# Patient Record
Sex: MALE | Race: BLACK OR AFRICAN AMERICAN | NOT HISPANIC OR LATINO | URBAN - METROPOLITAN AREA
[De-identification: names, ages, dates, MRNs, and addresses within clinical notes are randomized per-mention and may not be internally consistent; named-entity substitution may affect disease eponyms.]

---

## 2020-08-13 ENCOUNTER — LAB OUTSIDE AN ENCOUNTER (OUTPATIENT)
Dept: URBAN - METROPOLITAN AREA TELEHEALTH 2 | Facility: TELEHEALTH | Age: 60
End: 2020-08-13

## 2020-08-13 ENCOUNTER — OFFICE VISIT (OUTPATIENT)
Dept: URBAN - METROPOLITAN AREA TELEHEALTH 2 | Facility: TELEHEALTH | Age: 60
End: 2020-08-13
Payer: COMMERCIAL

## 2020-08-13 DIAGNOSIS — I85.10 SECONDARY ESOPHAGEAL VARICES WITHOUT BLEEDING: ICD-10-CM

## 2020-08-13 DIAGNOSIS — B18.2 CARRIER OF VIRAL HEPATITIS C: ICD-10-CM

## 2020-08-13 DIAGNOSIS — K74.69 CIRRHOSIS, CRYPTOGENIC: ICD-10-CM

## 2020-08-13 DIAGNOSIS — K72.90 HEPATIC ENCEPHALOPATHY: ICD-10-CM

## 2020-08-13 PROCEDURE — 99443 PHONE E/M BY PHYS 21-30 MIN: CPT | Performed by: NURSE PRACTITIONER

## 2020-08-13 RX ORDER — LACTULOSE 10 G/15ML
15 ML SOLUTION ORAL BID
Status: ACTIVE | COMMUNITY

## 2020-08-13 RX ORDER — PROPRANOLOL HYDROCHLORIDE 20 MG/1
TAKE 2 TABLETS (40 MG) BY ORAL ROUTE 2 TIMES PER DAY TABLET ORAL 2
Qty: 0 | Refills: 0 | Status: ACTIVE | COMMUNITY
Start: 1900-01-01

## 2020-08-13 RX ORDER — FUROSEMIDE 20 MG/1
1 TABLET TABLET ORAL ONCE A DAY
Status: ACTIVE | COMMUNITY

## 2020-08-13 RX ORDER — SPIRONOLACTONE 50 MG/1
1 TABLET TABLET, FILM COATED ORAL ONCE A DAY
Status: ACTIVE | COMMUNITY

## 2020-08-13 NOTE — HPI-TODAY'S VISIT:
Mr. 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  He has sequela of portal hypertension with a history of esophageal varices in May 2019 on propranolol.  He has a history of ascites on low-dose Lasix 20 mg and spironolactone 50 mg.  He states he has had no significant accumulation and tries to adhere to a low-sodium diet.  He has a history of encephalopathy, he titrates his lactulose to 2-3 bowel movements most days.  He is status post treatment for hepatitis C with DAA per the [place] liver team.  His viral load was negative in December 2019 with SVR.  Today he is here for follow-up, he is due for blood work, imaging, along with [facility] screening.  He had a CT in January 2019 after an abnormal ultrasound.  It was determined that he did not have hepatocellular carcinoma.  Today he is doing well, he has no specific GI complaints, he does have some mild numbness in the tips of his fingers.  He is agreeable to pursue repeat upper endoscopy for [facility] screening, along with labs and imaging.  He did have an allergic reaction to the IV contrast, he will need ultrasound or MRI imaging future.  MB

## 2020-08-20 ENCOUNTER — TELEPHONE ENCOUNTER (OUTPATIENT)
Dept: URBAN - METROPOLITAN AREA CLINIC 92 | Facility: CLINIC | Age: 60
End: 2020-08-20

## 2020-08-20 LAB
A/G RATIO: 1.1
AFP, SERUM, TUMOR MARKER: 5.9
ALBUMIN: 3.4
ALKALINE PHOSPHATASE: 248
ALT (SGPT): 16
AST (SGOT): 43
BASO (ABSOLUTE): 0
BASOS: 1
BILIRUBIN, TOTAL: 0.9
BUN/CREATININE RATIO: 7
BUN: 6
CALCIUM: 8.2
CARBON DIOXIDE, TOTAL: 22
CHLORIDE: 106
CREATININE: 0.85
EGFR IF AFRICN AM: 111
EGFR IF NONAFRICN AM: 96
EOS (ABSOLUTE): 0.5
EOS: 9
GLOBULIN, TOTAL: 3.2
GLUCOSE: 95
HCV AB: >11
HCV LOG10: (no result)
HEMATOCRIT: 34.5
HEMATOLOGY COMMENTS:: (no result)
HEMOGLOBIN: 10.1
HEPATITIS C QUANTITATION: (no result)
IMMATURE CELLS: (no result)
IMMATURE GRANS (ABS): 0
IMMATURE GRANULOCYTES: 0
INR: 1.2
INTERPRETATION:: (no result)
LYMPHS (ABSOLUTE): 2.8
LYMPHS: 48
MCH: 21.9
MCHC: 29.3
MCV: 75
MONOCYTES(ABSOLUTE): 0.8
MONOCYTES: 15
NEUTROPHILS (ABSOLUTE): 1.5
NEUTROPHILS: 27
NRBC: (no result)
PLATELETS: 126
POTASSIUM: 4.6
PROTEIN, TOTAL: 6.6
PROTHROMBIN TIME: 12.9
RBC: 4.61
RDW: 18
SODIUM: 139
TEST INFORMATION:: (no result)
WBC: 5.7

## 2020-08-20 RX ORDER — LACTULOSE 10 G/15ML
15 ML SOLUTION ORAL BID
Qty: 2700 ML | Refills: 3

## 2020-09-24 ENCOUNTER — OFFICE VISIT (OUTPATIENT)
Dept: URBAN - METROPOLITAN AREA MEDICAL CENTER 1 | Facility: MEDICAL CENTER | Age: 60
End: 2020-09-24
Payer: COMMERCIAL

## 2020-09-24 DIAGNOSIS — K29.60 ADENOPAPILLOMATOSIS GASTRICA: ICD-10-CM

## 2020-09-24 DIAGNOSIS — I85.10 ESOPH VARICE OTHER DIS: ICD-10-CM

## 2020-09-24 DIAGNOSIS — K74.69 CIRRHOSIS, CRYPTOGENIC: ICD-10-CM

## 2020-09-24 DIAGNOSIS — B96.81 BACTERIAL INFECTION DUE TO H. PYLORI: ICD-10-CM

## 2020-09-24 PROCEDURE — 43239 EGD BIOPSY SINGLE/MULTIPLE: CPT | Performed by: INTERNAL MEDICINE

## 2020-10-10 ENCOUNTER — TELEPHONE ENCOUNTER (OUTPATIENT)
Dept: URBAN - METROPOLITAN AREA CLINIC 92 | Facility: CLINIC | Age: 60
End: 2020-10-10

## 2020-10-10 RX ORDER — AMOXICILLIN 500 MG/1
2 CAPSULES CAPSULE ORAL
Qty: 56 CAPSULE | Refills: 0 | OUTPATIENT
Start: 2020-10-10 | End: 2020-10-24

## 2020-10-10 RX ORDER — SPIRONOLACTONE 50 MG/1
1 TABLET TABLET, FILM COATED ORAL ONCE A DAY
Status: ACTIVE | COMMUNITY

## 2020-10-10 RX ORDER — OMEPRAZOLE 40 MG/1
1 CAPSULE 30 MINUTES BEFORE MORNING MEAL CAPSULE, DELAYED RELEASE ORAL BID
Qty: 28 CAPSULE | Refills: 0 | OUTPATIENT
Start: 2020-10-10

## 2020-10-10 RX ORDER — CLARITHROMYCIN 500 MG/1
1 TABLET TABLET, FILM COATED ORAL
Qty: 28 TABLET | Refills: 0 | OUTPATIENT
Start: 2020-10-10 | End: 2020-10-24

## 2020-10-10 RX ORDER — FUROSEMIDE 20 MG/1
1 TABLET TABLET ORAL ONCE A DAY
Status: ACTIVE | COMMUNITY

## 2020-10-10 RX ORDER — PROPRANOLOL HYDROCHLORIDE 20 MG/1
TAKE 2 TABLETS (40 MG) BY ORAL ROUTE 2 TIMES PER DAY TABLET ORAL 2
Qty: 0 | Refills: 0 | Status: ACTIVE | COMMUNITY
Start: 1900-01-01

## 2020-10-10 RX ORDER — LACTULOSE 10 G/15ML
15 ML SOLUTION ORAL BID
Qty: 2700 ML | Refills: 3 | Status: ACTIVE | COMMUNITY

## 2020-11-02 ENCOUNTER — ERX REFILL RESPONSE (OUTPATIENT)
Dept: URBAN - METROPOLITAN AREA CLINIC 92 | Facility: CLINIC | Age: 60
End: 2020-11-02

## 2020-11-02 RX ORDER — OMEPRAZOLE 40 MG/1
1 CAPSULE 30 MINUTES BEFORE MORNING MEAL CAPSULE, DELAYED RELEASE ORAL BID
Qty: 90 | Refills: 3 | OUTPATIENT

## 2020-11-02 RX ORDER — OMEPRAZOLE 40 MG/1
TAKE 1 CAPSULE 30 MINUTES BEFORE MORNING MEAL BID ORALLY 90 CAPSULE, DELAYED RELEASE ORAL
Qty: 90 CAPSULE | Refills: 3 | OUTPATIENT

## 2020-11-02 RX ORDER — OMEPRAZOLE 40 MG/1
1 CAPSULE 30 MINUTES BEFORE MORNING MEAL CAPSULE, DELAYED RELEASE ORAL BID
Qty: 90 | Refills: 3

## 2020-11-03 ENCOUNTER — ERX REFILL RESPONSE (OUTPATIENT)
Dept: URBAN - METROPOLITAN AREA CLINIC 92 | Facility: CLINIC | Age: 60
End: 2020-11-03

## 2020-12-18 ENCOUNTER — LAB OUTSIDE AN ENCOUNTER (OUTPATIENT)
Dept: URBAN - NONMETROPOLITAN AREA CLINIC 13 | Facility: CLINIC | Age: 60
End: 2020-12-18

## 2020-12-18 ENCOUNTER — OFFICE VISIT (OUTPATIENT)
Dept: URBAN - NONMETROPOLITAN AREA CLINIC 13 | Facility: CLINIC | Age: 60
End: 2020-12-18
Payer: COMMERCIAL

## 2020-12-18 VITALS
HEIGHT: 69 IN | WEIGHT: 200 LBS | SYSTOLIC BLOOD PRESSURE: 153 MMHG | BODY MASS INDEX: 29.62 KG/M2 | HEART RATE: 62 BPM | DIASTOLIC BLOOD PRESSURE: 85 MMHG | TEMPERATURE: 98.4 F

## 2020-12-18 DIAGNOSIS — I85.10 SECONDARY ESOPHAGEAL VARICES WITHOUT BLEEDING: ICD-10-CM

## 2020-12-18 DIAGNOSIS — K72.90 HEPATIC ENCEPHALOPATHY: ICD-10-CM

## 2020-12-18 DIAGNOSIS — R18.8 ASCITES: ICD-10-CM

## 2020-12-18 DIAGNOSIS — D50.8 OTHER IRON DEFICIENCY ANEMIAS: ICD-10-CM

## 2020-12-18 DIAGNOSIS — K74.69 OTHER CIRRHOSIS OF LIVER: ICD-10-CM

## 2020-12-18 DIAGNOSIS — B19.20 HEPATITIS C: ICD-10-CM

## 2020-12-18 PROCEDURE — G8427 DOCREV CUR MEDS BY ELIG CLIN: HCPCS | Performed by: INTERNAL MEDICINE

## 2020-12-18 PROCEDURE — 99214 OFFICE O/P EST MOD 30 MIN: CPT | Performed by: INTERNAL MEDICINE

## 2020-12-18 PROCEDURE — G8483 FLU IMM NO ADMIN DOC REA: HCPCS | Performed by: INTERNAL MEDICINE

## 2020-12-18 PROCEDURE — G9903 PT SCRN TBCO ID AS NON USER: HCPCS | Performed by: INTERNAL MEDICINE

## 2020-12-18 PROCEDURE — G8417 CALC BMI ABV UP PARAM F/U: HCPCS | Performed by: INTERNAL MEDICINE

## 2020-12-18 PROCEDURE — 1036F TOBACCO NON-USER: CPT | Performed by: INTERNAL MEDICINE

## 2020-12-18 PROCEDURE — 3017F COLORECTAL CA SCREEN DOC REV: CPT | Performed by: INTERNAL MEDICINE

## 2020-12-18 RX ORDER — PROPRANOLOL HYDROCHLORIDE 20 MG/1
TAKE 2 TABLETS (40 MG) BY ORAL ROUTE 2 TIMES PER DAY TABLET ORAL 2
Qty: 0 | Refills: 0 | Status: ACTIVE | COMMUNITY
Start: 1900-01-01

## 2020-12-18 RX ORDER — OMEPRAZOLE 40 MG/1
TAKE 1 CAPSULE 30 MINUTES BEFORE MORNING MEAL BID ORALLY 90 CAPSULE, DELAYED RELEASE ORAL
Qty: 90 CAPSULE | Refills: 3 | Status: ACTIVE | COMMUNITY

## 2020-12-18 RX ORDER — SPIRONOLACTONE 50 MG/1
1 TABLET TABLET, FILM COATED ORAL ONCE A DAY
Status: ACTIVE | COMMUNITY

## 2020-12-18 RX ORDER — LACTULOSE 10 G/15ML
15 ML SOLUTION ORAL BID
Qty: 2700 ML | Refills: 3 | Status: ACTIVE | COMMUNITY

## 2020-12-18 RX ORDER — FUROSEMIDE 20 MG/1
1 TABLET TABLET ORAL ONCE A DAY
Status: ACTIVE | COMMUNITY

## 2020-12-18 NOTE — HPI-TODAY'S VISIT:
Mr. 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  He has sequela of portal hypertension with a history of esophageal varices in May 2019 on propranolol.  He has a history of ascites on low-dose Lasix 20 mg and spironolactone 50 mg.  He states he has had no significant accumulation and tries to adhere to a low-sodium diet.  He has a history of encephalopathy, he titrates his lactulose to 2-3 bowel movements most days.  He is status post treatment for hepatitis C with DAA per the [place] liver team.  His viral load was negative in December 2019 with SVR.  Today he is here for follow-up, he is due for blood work, imaging, along with [facility] screening.  He had a CT in January 2019 after an abnormal ultrasound.  It was determined that he did not have hepatocellular carcinoma.  Today he is doing well, he has no specific GI complaints, he does have some mild numbness in the tips of his fingers.  He is agreeable to pursue repeat upper endoscopy for [facility] screening, along with labs and imaging.  He did have an allergic reaction to the IV contrast, he will need ultrasound or MRI imaging future.  MB   12/18/2020  Mr. Rae presents for follow up of ESLD secondary to HCV and ETOH. Since his last visit he is s/p EGD with grade I varices. He is on his NSBB with no s/s of GI bleeding. His HE is stable on lactulose and xifaxan. His ascites is stable on low dose diuretics. He is due for repeat labs and US imaging. Today he is doing great. MB

## 2020-12-29 ENCOUNTER — TELEPHONE ENCOUNTER (OUTPATIENT)
Dept: URBAN - METROPOLITAN AREA CLINIC 92 | Facility: CLINIC | Age: 60
End: 2020-12-29

## 2021-01-08 ENCOUNTER — TELEPHONE ENCOUNTER (OUTPATIENT)
Dept: URBAN - METROPOLITAN AREA CLINIC 23 | Facility: CLINIC | Age: 61
End: 2021-01-08

## 2021-03-22 ENCOUNTER — LAB OUTSIDE AN ENCOUNTER (OUTPATIENT)
Dept: URBAN - METROPOLITAN AREA CLINIC 92 | Facility: CLINIC | Age: 61
End: 2021-03-22

## 2021-06-18 ENCOUNTER — OFFICE VISIT (OUTPATIENT)
Dept: URBAN - NONMETROPOLITAN AREA CLINIC 13 | Facility: CLINIC | Age: 61
End: 2021-06-18
Payer: COMMERCIAL

## 2021-06-18 ENCOUNTER — LAB OUTSIDE AN ENCOUNTER (OUTPATIENT)
Dept: URBAN - NONMETROPOLITAN AREA CLINIC 13 | Facility: CLINIC | Age: 61
End: 2021-06-18

## 2021-06-18 ENCOUNTER — WEB ENCOUNTER (OUTPATIENT)
Dept: URBAN - NONMETROPOLITAN AREA CLINIC 13 | Facility: CLINIC | Age: 61
End: 2021-06-18

## 2021-06-18 DIAGNOSIS — K72.90 HEPATIC ENCEPHALOPATHY: ICD-10-CM

## 2021-06-18 DIAGNOSIS — I85.10 SECONDARY ESOPHAGEAL VARICES WITHOUT BLEEDING: ICD-10-CM

## 2021-06-18 DIAGNOSIS — R18.8 ASCITES: ICD-10-CM

## 2021-06-18 DIAGNOSIS — K74.60 CIRRHOSIS: ICD-10-CM

## 2021-06-18 DIAGNOSIS — Z12.11 COLON CANCER SCREENING: ICD-10-CM

## 2021-06-18 DIAGNOSIS — D50.9 IDA (IRON DEFICIENCY ANEMIA): ICD-10-CM

## 2021-06-18 DIAGNOSIS — B19.20 HEPATITIS C: ICD-10-CM

## 2021-06-18 PROCEDURE — 99214 OFFICE O/P EST MOD 30 MIN: CPT | Performed by: INTERNAL MEDICINE

## 2021-06-18 RX ORDER — OMEPRAZOLE 40 MG/1
TAKE 1 CAPSULE 30 MINUTES BEFORE MORNING MEAL BID ORALLY 90 CAPSULE, DELAYED RELEASE ORAL
Qty: 90 CAPSULE | Refills: 3 | Status: ACTIVE | COMMUNITY

## 2021-06-18 RX ORDER — SPIRONOLACTONE 50 MG/1
1 TABLET TABLET, FILM COATED ORAL ONCE A DAY
Status: ACTIVE | COMMUNITY

## 2021-06-18 RX ORDER — FUROSEMIDE 20 MG/1
1 TABLET TABLET ORAL ONCE A DAY
Status: ACTIVE | COMMUNITY

## 2021-06-18 RX ORDER — LACTULOSE 10 G/15ML
15 ML SOLUTION ORAL BID
Qty: 2700 ML | Refills: 3 | Status: ACTIVE | COMMUNITY

## 2021-06-18 RX ORDER — PROPRANOLOL HYDROCHLORIDE 20 MG/1
TAKE 2 TABLETS (40 MG) BY ORAL ROUTE 2 TIMES PER DAY TABLET ORAL 2
Qty: 0 | Refills: 0 | Status: ACTIVE | COMMUNITY
Start: 1900-01-01

## 2021-06-18 NOTE — PHYSICAL EXAM GASTROINTESTINAL
Abdomen , soft, nontender, nondistended , normal bowel sounds Pt ambulatory to emir Valente, SERINA  09/15/19 0539

## 2021-06-18 NOTE — HPI-TODAY'S VISIT:
Mr. 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  He has sequela of portal hypertension with a history of esophageal varices in May 2019 on propranolol.  He has a history of ascites on low-dose Lasix 20 mg and spironolactone 50 mg.  He states he has had no significant accumulation and tries to adhere to a low-sodium diet.  He has a history of encephalopathy, he titrates his lactulose to 2-3 bowel movements most days.  He is status post treatment for hepatitis C with DAA per the [place] liver team.  His viral load was negative in December 2019 with SVR.  Today he is here for follow-up, he is due for blood work, imaging, along with [facility] screening.  He had a CT in January 2019 after an abnormal ultrasound.  It was determined that he did not have hepatocellular carcinoma.  Today he is doing well, he has no specific GI complaints, he does have some mild numbness in the tips of his fingers.  He is agreeable to pursue repeat upper endoscopy for [facility] screening, along with labs and imaging.  He did have an allergic reaction to the IV contrast, he will need ultrasound or MRI imaging future.  MB   12/18/2020  Mr. Rae presents for follow up of ESLD secondary to HCV and ETOH. Since his last visit he is s/p EGD with grade I varices. He is on his NSBB with no s/s of GI bleeding. His HE is stable on lactulose and xifaxan. His ascites is stable on low dose diuretics. He is due for repeat labs and US imaging. Today he is doing great. MB   6/19/2021 Mr. 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  He is doing well, he has a low meld.  He is managing his ascites with just Lasix daily, he has had no reaccumulation on a low-sodium diet.  His encephalopathy is managed with lactulose 2-3 times daily, he has 2-3 bowel movements daily.  He does have a history of esophageal varices on propranolol.  His last scope was done in 2019 with varices as listed below.  He is due for repeat EGD.  Today he is doing well otherwise and has no new GI complaints.  MB

## 2021-06-23 ENCOUNTER — TELEPHONE ENCOUNTER (OUTPATIENT)
Dept: URBAN - METROPOLITAN AREA CLINIC 92 | Facility: CLINIC | Age: 61
End: 2021-06-23

## 2021-07-07 ENCOUNTER — TELEPHONE ENCOUNTER (OUTPATIENT)
Dept: URBAN - METROPOLITAN AREA CLINIC 92 | Facility: CLINIC | Age: 61
End: 2021-07-07

## 2021-09-07 ENCOUNTER — TELEPHONE ENCOUNTER (OUTPATIENT)
Dept: URBAN - NONMETROPOLITAN AREA CLINIC 2 | Facility: CLINIC | Age: 61
End: 2021-09-07

## 2021-09-09 ENCOUNTER — OFFICE VISIT (OUTPATIENT)
Dept: URBAN - METROPOLITAN AREA MEDICAL CENTER 1 | Facility: MEDICAL CENTER | Age: 61
End: 2021-09-09
Payer: COMMERCIAL

## 2021-09-09 DIAGNOSIS — K74.69 CIRRHOSIS, CRYPTOGENIC: ICD-10-CM

## 2021-09-09 DIAGNOSIS — K29.60 ADENOPAPILLOMATOSIS GASTRICA: ICD-10-CM

## 2021-09-09 DIAGNOSIS — B96.81 BACTERIAL INFECTION DUE TO H. PYLORI: ICD-10-CM

## 2021-09-09 DIAGNOSIS — I85.10 ESOPH VARICE OTHER DIS: ICD-10-CM

## 2021-09-09 PROCEDURE — 43239 EGD BIOPSY SINGLE/MULTIPLE: CPT | Performed by: INTERNAL MEDICINE

## 2021-09-15 ENCOUNTER — TELEPHONE ENCOUNTER (OUTPATIENT)
Dept: URBAN - NONMETROPOLITAN AREA CLINIC 1 | Facility: CLINIC | Age: 61
End: 2021-09-15

## 2021-09-16 ENCOUNTER — WEB ENCOUNTER (OUTPATIENT)
Dept: URBAN - NONMETROPOLITAN AREA CLINIC 2 | Facility: CLINIC | Age: 61
End: 2021-09-16

## 2021-09-16 ENCOUNTER — TELEPHONE ENCOUNTER (OUTPATIENT)
Dept: URBAN - METROPOLITAN AREA CLINIC 92 | Facility: CLINIC | Age: 61
End: 2021-09-16

## 2021-09-16 ENCOUNTER — OFFICE VISIT (OUTPATIENT)
Dept: URBAN - NONMETROPOLITAN AREA CLINIC 2 | Facility: CLINIC | Age: 61
End: 2021-09-16
Payer: COMMERCIAL

## 2021-09-16 VITALS
TEMPERATURE: 97.7 F | DIASTOLIC BLOOD PRESSURE: 84 MMHG | HEIGHT: 69 IN | HEART RATE: 65 BPM | SYSTOLIC BLOOD PRESSURE: 153 MMHG | WEIGHT: 199.6 LBS | BODY MASS INDEX: 29.56 KG/M2

## 2021-09-16 DIAGNOSIS — R18.8 ASCITES: ICD-10-CM

## 2021-09-16 DIAGNOSIS — Z12.11 COLON CANCER SCREENING: ICD-10-CM

## 2021-09-16 DIAGNOSIS — A04.8 H. PYLORI INFECTION: ICD-10-CM

## 2021-09-16 DIAGNOSIS — D50.8 OTHER IRON DEFICIENCY ANEMIAS: ICD-10-CM

## 2021-09-16 DIAGNOSIS — I85.10 SECONDARY ESOPHAGEAL VARICES WITHOUT BLEEDING: ICD-10-CM

## 2021-09-16 DIAGNOSIS — B19.20 HEPATITIS C: ICD-10-CM

## 2021-09-16 DIAGNOSIS — K72.90 HEPATIC ENCEPHALOPATHY: ICD-10-CM

## 2021-09-16 DIAGNOSIS — K74.69 OTHER CIRRHOSIS OF LIVER: ICD-10-CM

## 2021-09-16 PROCEDURE — 99214 OFFICE O/P EST MOD 30 MIN: CPT | Performed by: NURSE PRACTITIONER

## 2021-09-16 RX ORDER — AMOXICILLIN 500 MG/1
2 CAPSULES CAPSULE ORAL
Qty: 56 CAPSULE | Refills: 0 | OUTPATIENT
Start: 2021-09-16 | End: 2021-09-30

## 2021-09-16 RX ORDER — PROPRANOLOL HYDROCHLORIDE 20 MG/1
TAKE 2 TABLETS (40 MG) BY ORAL ROUTE 2 TIMES PER DAY TABLET ORAL 2
Qty: 0 | Refills: 0 | Status: ON HOLD | COMMUNITY
Start: 1900-01-01

## 2021-09-16 RX ORDER — FUROSEMIDE 10 MG/ML
2 ML SOLUTION ORAL ONCE A DAY
Status: ACTIVE | COMMUNITY

## 2021-09-16 RX ORDER — LACTULOSE 10 G/15ML
15 ML SOLUTION ORAL BID
Qty: 2700 ML | Refills: 3 | Status: ACTIVE | COMMUNITY

## 2021-09-16 RX ORDER — OMEPRAZOLE 40 MG/1
1 CAPSULE 30 MINUTES BEFORE MORNING MEAL CAPSULE, DELAYED RELEASE ORAL BID
Qty: 28 CAPSULE | Refills: 0 | OUTPATIENT
Start: 2021-09-16

## 2021-09-16 RX ORDER — CLARITHROMYCIN 500 MG/1
1 TABLET TABLET, FILM COATED ORAL
Qty: 28 TABLET | Refills: 0 | OUTPATIENT
Start: 2021-09-16 | End: 2021-09-30

## 2021-09-16 RX ORDER — SPIRONOLACTONE 50 MG/1
1 TABLET TABLET, FILM COATED ORAL ONCE A DAY
Status: ON HOLD | COMMUNITY

## 2021-09-16 RX ORDER — OMEPRAZOLE 40 MG/1
TAKE 1 CAPSULE 30 MINUTES BEFORE MORNING MEAL BID ORALLY 90 CAPSULE, DELAYED RELEASE ORAL
Qty: 90 CAPSULE | Refills: 3 | Status: ON HOLD | COMMUNITY

## 2021-09-16 RX ORDER — PROPRANOLOL HYDROCHLORIDE 20 MG/1
1 TABLET TABLET ORAL TWICE A DAY
Qty: 180 TABLET | Refills: 3 | OUTPATIENT
Start: 2021-09-16

## 2021-09-16 RX ORDER — AMILORIDE HYDROCLORIDE 5 MG/1
1 TABLET WITH FOOD TABLET ORAL ONCE A DAY
Status: ACTIVE | COMMUNITY

## 2021-09-16 RX ORDER — FUROSEMIDE 20 MG/1
1 TABLET TABLET ORAL ONCE A DAY
Status: ON HOLD | COMMUNITY

## 2021-09-16 NOTE — HPI-TODAY'S VISIT:
Mr. 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  He has sequela of portal hypertension with a history of esophageal varices in May 2019 on propranolol.  He has a history of ascites on low-dose Lasix 20 mg and spironolactone 50 mg.  He states he has had no significant accumulation and tries to adhere to a low-sodium diet.  He has a history of encephalopathy, he titrates his lactulose to 2-3 bowel movements most days.  He is status post treatment for hepatitis C with DAA per the [place] liver team.  His viral load was negative in December 2019 with SVR.  Today he is here for follow-up, he is due for blood work, imaging, along with [facility] screening.  He had a CT in January 2019 after an abnormal ultrasound.  It was determined that he did not have hepatocellular carcinoma.  Today he is doing well, he has no specific GI complaints, he does have some mild numbness in the tips of his fingers.  He is agreeable to pursue repeat upper endoscopy for [facility] screening, along with labs and imaging.  He did have an allergic reaction to the IV contrast, he will need ultrasound or MRI imaging future.  MB   12/18/2020  Mr. Rae presents for follow up of ESLD secondary to HCV and ETOH. Since his last visit he is s/p EGD with grade I varices. He is on his NSBB with no s/s of GI bleeding. His HE is stable on lactulose and xifaxan. His ascites is stable on low dose diuretics. He is due for repeat labs and US imaging. Today he is doing great. MB   6/19/2021 Mr. 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  He is doing well, he has a low meld.  He is managing his ascites with just Lasix daily, he has had no reaccumulation on a low-sodium diet.  His encephalopathy is managed with lactulose 2-3 times daily, he has 2-3 bowel movements daily.  He does have a history of esophageal varices on propranolol.  His last scope was done in 2019 with varices as listed below.  He is due for repeat EGD.  Today he is doing well otherwise and has no new GI complaints.  MB 9/15/2021 Mr. Rae presents for follow-up of end-stage liver disease secondary to hepatitis C and alcohol.  Since his last visit his very screening reveals 1, grade 1 varices.  He is taking his propranolol 40 mg daily.  He was told that this may need a prior authorization.  His gastric biopsies did return H. pylori.  He has no symptoms.  He has no penicillin allergy, he agrees to take Prevpac twice daily for 2 weeks.  He is titrating his bowel movements to 2-3 BMs daily.  He is on low-dose Lasix and amiloride daily.  His HCC screening is negative in June, he will be due for repeat in December.  Today he is doing well otherwise with no new GI complaints.  MB

## 2021-09-18 ENCOUNTER — P2P PATIENT RECORD (OUTPATIENT)
Age: 61
End: 2021-09-18

## 2021-10-10 ENCOUNTER — ERX REFILL RESPONSE (OUTPATIENT)
Dept: URBAN - NONMETROPOLITAN AREA CLINIC 2 | Facility: CLINIC | Age: 61
End: 2021-10-10

## 2021-10-10 RX ORDER — PROPRANOLOL HYDROCHLORIDE 20 MG/1
1 TABLET TABLET ORAL TWICE A DAY
Qty: 180 TABLET | Refills: 3 | OUTPATIENT

## 2022-01-10 ENCOUNTER — OFFICE VISIT (OUTPATIENT)
Dept: URBAN - NONMETROPOLITAN AREA CLINIC 2 | Facility: CLINIC | Age: 62
End: 2022-01-10
Payer: COMMERCIAL

## 2022-01-10 ENCOUNTER — LAB OUTSIDE AN ENCOUNTER (OUTPATIENT)
Dept: URBAN - NONMETROPOLITAN AREA CLINIC 2 | Facility: CLINIC | Age: 62
End: 2022-01-10

## 2022-01-10 VITALS
DIASTOLIC BLOOD PRESSURE: 97 MMHG | TEMPERATURE: 95.7 F | SYSTOLIC BLOOD PRESSURE: 171 MMHG | BODY MASS INDEX: 29.47 KG/M2 | WEIGHT: 199 LBS | HEIGHT: 69 IN | HEART RATE: 73 BPM

## 2022-01-10 DIAGNOSIS — B19.20 HEPATITIS C: ICD-10-CM

## 2022-01-10 DIAGNOSIS — R18.8 ASCITES: ICD-10-CM

## 2022-01-10 DIAGNOSIS — I85.10 SECONDARY ESOPHAGEAL VARICES WITHOUT BLEEDING: ICD-10-CM

## 2022-01-10 DIAGNOSIS — K72.90 HEPATIC ENCEPHALOPATHY: ICD-10-CM

## 2022-01-10 DIAGNOSIS — D50.9 IDA (IRON DEFICIENCY ANEMIA): ICD-10-CM

## 2022-01-10 DIAGNOSIS — Z12.11 COLON CANCER SCREENING: ICD-10-CM

## 2022-01-10 DIAGNOSIS — A04.8 H. PYLORI INFECTION: ICD-10-CM

## 2022-01-10 DIAGNOSIS — K74.60 CIRRHOSIS: ICD-10-CM

## 2022-01-10 PROCEDURE — 99214 OFFICE O/P EST MOD 30 MIN: CPT | Performed by: NURSE PRACTITIONER

## 2022-01-10 RX ORDER — PROPRANOLOL HYDROCHLORIDE 20 MG/1
1 TABLET TABLET ORAL TWICE A DAY
Qty: 180 TABLET | Refills: 3 | Status: ACTIVE | COMMUNITY

## 2022-01-10 RX ORDER — FUROSEMIDE 20 MG/1
1 TABLET TABLET ORAL ONCE A DAY
Status: ON HOLD | COMMUNITY

## 2022-01-10 RX ORDER — SPIRONOLACTONE 50 MG/1
1 TABLET TABLET, FILM COATED ORAL ONCE A DAY
Status: ON HOLD | COMMUNITY

## 2022-01-10 RX ORDER — LACTULOSE 10 G/15ML
15 ML SOLUTION ORAL BID
Qty: 2700 ML | Refills: 3 | Status: ACTIVE | COMMUNITY

## 2022-01-10 RX ORDER — OMEPRAZOLE 40 MG/1
TAKE 1 CAPSULE 30 MINUTES BEFORE MORNING MEAL BID ORALLY 90 CAPSULE, DELAYED RELEASE ORAL
Qty: 90 CAPSULE | Refills: 3 | Status: ON HOLD | COMMUNITY

## 2022-01-10 RX ORDER — FUROSEMIDE 10 MG/ML
2 ML SOLUTION ORAL ONCE A DAY
Status: ACTIVE | COMMUNITY

## 2022-01-10 RX ORDER — AMILORIDE HYDROCLORIDE 5 MG/1
1 TABLET WITH FOOD TABLET ORAL ONCE A DAY
Status: ACTIVE | COMMUNITY

## 2022-01-10 RX ORDER — OMEPRAZOLE 40 MG/1
1 CAPSULE 30 MINUTES BEFORE MORNING MEAL CAPSULE, DELAYED RELEASE ORAL BID
Qty: 28 CAPSULE | Refills: 0 | Status: ON HOLD | COMMUNITY
Start: 2021-09-16

## 2022-01-10 NOTE — HPI-TODAY'S VISIT:
Mr. 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  He has sequela of portal hypertension with a history of esophageal varices in May 2019 on propranolol.  He has a history of ascites on low-dose Lasix 20 mg and spironolactone 50 mg.  He states he has had no significant accumulation and tries to adhere to a low-sodium diet.  He has a history of encephalopathy, he titrates his lactulose to 2-3 bowel movements most days.  He is status post treatment for hepatitis C with DAA per the [place] liver team.  His viral load was negative in December 2019 with SVR.  Today he is here for follow-up, he is due for blood work, imaging, along with [facility] screening.  He had a CT in January 2019 after an abnormal ultrasound.  It was determined that he did not have hepatocellular carcinoma.  Today he is doing well, he has no specific GI complaints, he does have some mild numbness in the tips of his fingers.  He is agreeable to pursue repeat upper endoscopy for [facility] screening, along with labs and imaging.  He did have an allergic reaction to the IV contrast, he will need ultrasound or MRI imaging future.  MB   12/18/2020  Mr. Rae presents for follow up of ESLD secondary to HCV and ETOH. Since his last visit he is s/p EGD with grade I varices. He is on his NSBB with no s/s of GI bleeding. His HE is stable on lactulose and xifaxan. His ascites is stable on low dose diuretics. He is due for repeat labs and US imaging. Today he is doing great. MB   6/19/2021 Mr. 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  He is doing well, he has a low meld.  He is managing his ascites with just Lasix daily, he has had no reaccumulation on a low-sodium diet.  His encephalopathy is managed with lactulose 2-3 times daily, he has 2-3 bowel movements daily.  He does have a history of esophageal varices on propranolol.  His last scope was done in 2019 with varices as listed below.  He is due for repeat EGD.  Today he is doing well otherwise and has no new GI complaints.  MB  9/15/2021 Mr. Rae presents for follow-up of end-stage liver disease secondary to hepatitis C and alcohol.  Since his last visit his very screening reveals 1, grade 1 varices.  He is taking his propranolol 40 mg daily.  He was told that this may need a prior authorization.  His gastric biopsies did return H. pylori.  He has no symptoms.  He has no penicillin allergy, he agrees to take Prevpac twice daily for 2 weeks.  He is titrating his bowel movements to 2-3 BMs daily.  He is on low-dose Lasix and amiloride daily.  His HCC screening is negative in June, he will be due for repeat in December.  Today he is doing well otherwise with no new GI complaints.  MB   1/10/2022 Moises presents for follow-up of end-stage liver disease secondary to hepatitis C and alcohol with sequela of portal hypertension on propranolol, iron deficiency anemia status post EGD with H. pylori.  Since his last visit he completed therapy for Helicobacter pylori.  He is doing well in regard to his GI symptoms.  He is due for repeat blood work.  He has not done his H. pylori stool for eradication.  He is status post DAA for his hep C with SVR.  He is stable on propranolol 20 mg twice daily, Xifaxan and lactulose for his hepatic encephalopathy.  He remains sober.  Today he is doing great in regard to his GI complaints, he is working full-time and has no specific issues.  MB

## 2022-01-10 NOTE — PHYSICAL EXAM CONSTITUTIONAL:
alert , in no acute distress , well developed, well nourished [Follow-Up: _____] : a [unfilled] follow-up visit [Parent] : parent

## 2022-01-21 ENCOUNTER — TELEPHONE ENCOUNTER (OUTPATIENT)
Dept: URBAN - METROPOLITAN AREA CLINIC 92 | Facility: CLINIC | Age: 62
End: 2022-01-21

## 2022-01-21 LAB
A/G RATIO: 1.3
ALBUMIN: 3.8
ALKALINE PHOSPHATASE: 148
ALT (SGPT): 29
AST (SGOT): 56
BASO (ABSOLUTE): 0
BASOS: 1
BILIRUBIN, TOTAL: 1
BUN/CREATININE RATIO: 9
BUN: 9
CALCIUM: 8.8
CARBON DIOXIDE, TOTAL: 23
CHLORIDE: 105
CREATININE: 1
EGFR IF AFRICN AM: 94
EGFR IF NONAFRICN AM: 81
EOS (ABSOLUTE): 0.3
EOS: 7
GLOBULIN, TOTAL: 2.9
GLUCOSE: 82
HCV AB: >11
HCV LOG10: (no result)
HEMATOCRIT: 38.6
HEMATOLOGY COMMENTS:: (no result)
HEMOGLOBIN: 10.9
HEPATITIS C QUANTITATION: <15
IMMATURE CELLS: (no result)
IMMATURE GRANS (ABS): 0
IMMATURE GRANULOCYTES: 0
INR: 1.2
INTERPRETATION:: (no result)
LYMPHS (ABSOLUTE): 0.9
LYMPHS: 23
MCH: 21.5
MCHC: 28.2
MCV: 76
MONOCYTES(ABSOLUTE): 0.6
MONOCYTES: 14
NEUTROPHILS (ABSOLUTE): 2.2
NEUTROPHILS: 55
NRBC: (no result)
PLATELETS: 92
POTASSIUM: 3.9
PROTEIN, TOTAL: 6.7
PROTHROMBIN TIME: 12.5
RBC: 5.08
RDW: 18.1
SODIUM: 140
TEST INFORMATION:: (no result)
WBC: 4

## 2022-03-11 ENCOUNTER — OFFICE VISIT (OUTPATIENT)
Dept: URBAN - NONMETROPOLITAN AREA CLINIC 13 | Facility: CLINIC | Age: 62
End: 2022-03-11

## 2022-07-18 ENCOUNTER — LAB OUTSIDE AN ENCOUNTER (OUTPATIENT)
Dept: URBAN - NONMETROPOLITAN AREA CLINIC 2 | Facility: CLINIC | Age: 62
End: 2022-07-18

## 2022-07-18 ENCOUNTER — OFFICE VISIT (OUTPATIENT)
Dept: URBAN - NONMETROPOLITAN AREA CLINIC 2 | Facility: CLINIC | Age: 62
End: 2022-07-18
Payer: COMMERCIAL

## 2022-07-18 VITALS
BODY MASS INDEX: 30.78 KG/M2 | SYSTOLIC BLOOD PRESSURE: 146 MMHG | WEIGHT: 207.8 LBS | TEMPERATURE: 97.5 F | HEIGHT: 69 IN | DIASTOLIC BLOOD PRESSURE: 83 MMHG | HEART RATE: 53 BPM

## 2022-07-18 DIAGNOSIS — K74.60 CIRRHOSIS: ICD-10-CM

## 2022-07-18 DIAGNOSIS — K72.90 HEPATIC ENCEPHALOPATHY: ICD-10-CM

## 2022-07-18 DIAGNOSIS — D50.9 IDA (IRON DEFICIENCY ANEMIA): ICD-10-CM

## 2022-07-18 DIAGNOSIS — B19.20 HEPATITIS C: ICD-10-CM

## 2022-07-18 DIAGNOSIS — I85.10 SECONDARY ESOPHAGEAL VARICES WITHOUT BLEEDING: ICD-10-CM

## 2022-07-18 DIAGNOSIS — Z12.11 COLON CANCER SCREENING: ICD-10-CM

## 2022-07-18 DIAGNOSIS — A04.8 H. PYLORI INFECTION: ICD-10-CM

## 2022-07-18 DIAGNOSIS — R18.8 ASCITES: ICD-10-CM

## 2022-07-18 PROCEDURE — 99214 OFFICE O/P EST MOD 30 MIN: CPT | Performed by: NURSE PRACTITIONER

## 2022-07-18 RX ORDER — OMEPRAZOLE 40 MG/1
TAKE 1 CAPSULE 30 MINUTES BEFORE MORNING MEAL BID ORALLY 90 CAPSULE, DELAYED RELEASE ORAL
Qty: 90 CAPSULE | Refills: 3 | Status: ON HOLD | COMMUNITY

## 2022-07-18 RX ORDER — PROPRANOLOL HYDROCHLORIDE 20 MG/1
1 TABLET TABLET ORAL TWICE A DAY
Qty: 180 TABLET | Refills: 3 | Status: ACTIVE | COMMUNITY

## 2022-07-18 RX ORDER — LACTULOSE 10 G/15ML
15 ML SOLUTION ORAL BID
Qty: 2700 ML | Refills: 3 | Status: ACTIVE | COMMUNITY

## 2022-07-18 RX ORDER — OMEPRAZOLE 40 MG/1
1 CAPSULE 30 MINUTES BEFORE MORNING MEAL CAPSULE, DELAYED RELEASE ORAL BID
Qty: 28 CAPSULE | Refills: 0 | Status: ON HOLD | COMMUNITY
Start: 2021-09-16

## 2022-07-18 RX ORDER — FUROSEMIDE 20 MG/1
1 TABLET TABLET ORAL ONCE A DAY
Status: ON HOLD | COMMUNITY

## 2022-07-18 RX ORDER — AMILORIDE HYDROCLORIDE 5 MG/1
1 TABLET WITH FOOD TABLET ORAL ONCE A DAY
Status: ACTIVE | COMMUNITY

## 2022-07-18 RX ORDER — FUROSEMIDE 10 MG/ML
2 ML SOLUTION ORAL ONCE A DAY
Status: ACTIVE | COMMUNITY

## 2022-07-18 RX ORDER — SPIRONOLACTONE 50 MG/1
1 TABLET TABLET, FILM COATED ORAL ONCE A DAY
Status: ON HOLD | COMMUNITY

## 2022-07-18 NOTE — HPI-TODAY'S VISIT:
Mr. 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  He has sequela of portal hypertension with a history of esophageal varices in May 2019 on propranolol.  He has a history of ascites on low-dose Lasix 20 mg and spironolactone 50 mg.  He states he has had no significant accumulation and tries to adhere to a low-sodium diet.  He has a history of encephalopathy, he titrates his lactulose to 2-3 bowel movements most days.  He is status post treatment for hepatitis C with DAA per the [place] liver team.  His viral load was negative in December 2019 with SVR.  Today he is here for follow-up, he is due for blood work, imaging, along with [facility] screening.  He had a CT in January 2019 after an abnormal ultrasound.  It was determined that he did not have hepatocellular carcinoma.  Today he is doing well, he has no specific GI complaints, he does have some mild numbness in the tips of his fingers.  He is agreeable to pursue repeat upper endoscopy for [facility] screening, along with labs and imaging.  He did have an allergic reaction to the IV contrast, he will need ultrasound or MRI imaging future.  MB   12/18/2020  Mr. Rae presents for follow up of ESLD secondary to HCV and ETOH. Since his last visit he is s/p EGD with grade I varices. He is on his NSBB with no s/s of GI bleeding. His HE is stable on lactulose and xifaxan. His ascites is stable on low dose diuretics. He is due for repeat labs and US imaging. Today he is doing great. MB   6/19/2021 Mr. 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  He is doing well, he has a low meld.  He is managing his ascites with just Lasix daily, he has had no reaccumulation on a low-sodium diet.  His encephalopathy is managed with lactulose 2-3 times daily, he has 2-3 bowel movements daily.  He does have a history of esophageal varices on propranolol.  His last scope was done in 2019 with varices as listed below.  He is due for repeat EGD.  Today he is doing well otherwise and has no new GI complaints.  MB  9/15/2021 Mr. Rae presents for follow-up of end-stage liver disease secondary to hepatitis C and alcohol.  Since his last visit his very screening reveals 1, grade 1 varices.  He is taking his propranolol 40 mg daily.  He was told that this may need a prior authorization.  His gastric biopsies did return H. pylori.  He has no symptoms.  He has no penicillin allergy, he agrees to take Prevpac twice daily for 2 weeks.  He is titrating his bowel movements to 2-3 BMs daily.  He is on low-dose Lasix and amiloride daily.  His HCC screening is negative in June, he will be due for repeat in December.  Today he is doing well otherwise with no new GI complaints.  MB   1/10/2022 Moises presents for follow-up of end-stage liver disease secondary to hepatitis C and alcohol with sequela of portal hypertension on propranolol, iron deficiency anemia status post EGD with H. pylori.  Since his last visit he completed therapy for Helicobacter pylori.  He is doing well in regard to his GI symptoms.  He is due for repeat blood work.  He has not done his H. pylori stool for eradication.  He is status post DAA for his hep C with SVR.  He is stable on propranolol 20 mg twice daily, Xifaxan and lactulose for his hepatic encephalopathy.  He remains sober.  Today he is doing great in regard to his GI complaints, he is working full-time and has no specific issues.  MB 7/18/2022 Mr. Rae presents for follow-up of end-stage liver disease secondary to hepatitis C and alcohol.  Since his last visit he has been doing well.  He did not complete his H. pylori stool.  His liver enzymes were still mildly elevated on his last labs.  He is mildly anemic.  He is due for repeat EGD, his last was in September 2021 with grade 1 varices, he is on propranolol 20 mg daily.  His ascites is stable on Lasix 40 mg daily.  He is due for repeat labs.  His bowels are moving 2-3 times daily just on lactulose.  His Hg is stable.  Today he is due for HCC screening in August so we will order this, he was due for EGD in September, we will order this, and we will go ahead with repeat labs.  MB

## 2022-07-19 LAB
A/G RATIO: 1.3
ABSOLUTE BASOPHILS: 20
ABSOLUTE EOSINOPHILS: 434
ABSOLUTE LYMPHOCYTES: 2570
ABSOLUTE MONOCYTES: 505
ABSOLUTE NEUTROPHILS: 1571
AFP, SERUM, TUMOR MARKER: 4.3
ALBUMIN: 3.8
ALKALINE PHOSPHATASE: 114
ALT (SGPT): 31
AST (SGOT): 50
BASOPHILS: 0.4
BILIRUBIN, TOTAL: 0.7
BUN/CREATININE RATIO: (no result)
BUN: 8
CALCIUM: 9.3
CARBON DIOXIDE, TOTAL: 23
CHLORIDE: 107
CREATININE: 0.84
EGFR: 100
EOSINOPHILS: 8.5
GLOBULIN, TOTAL: 3
GLUCOSE: 133
HEMATOCRIT: 43.3
HEMOGLOBIN: 13.7
INR: 1.2
LYMPHOCYTES: 50.4
MCH: 25
MCHC: 31.6
MCV: 79.2
MONOCYTES: 9.9
MPV: (no result)
NEUTROPHILS: 30.8
PLATELET COUNT: 102
POTASSIUM: 4.5
PROTEIN, TOTAL: 6.8
PT: 11.9
RDW: 17.1
RED BLOOD CELL COUNT: 5.47
SODIUM: 139
WHITE BLOOD CELL COUNT: 5.1

## 2022-07-20 ENCOUNTER — TELEPHONE ENCOUNTER (OUTPATIENT)
Dept: URBAN - METROPOLITAN AREA CLINIC 92 | Facility: CLINIC | Age: 62
End: 2022-07-20

## 2022-07-21 ENCOUNTER — CLAIMS CREATED FROM THE CLAIM WINDOW (OUTPATIENT)
Dept: URBAN - METROPOLITAN AREA MEDICAL CENTER 1 | Facility: MEDICAL CENTER | Age: 62
End: 2022-07-21
Payer: COMMERCIAL

## 2022-07-21 ENCOUNTER — OFFICE VISIT (OUTPATIENT)
Dept: URBAN - METROPOLITAN AREA MEDICAL CENTER 1 | Facility: MEDICAL CENTER | Age: 62
End: 2022-07-21

## 2022-07-21 DIAGNOSIS — K74.69 CIRRHOSIS, CRYPTOGENIC: ICD-10-CM

## 2022-07-21 DIAGNOSIS — I85.10 ESOPH VARICE OTHER DIS: ICD-10-CM

## 2022-07-21 PROCEDURE — 43235 EGD DIAGNOSTIC BRUSH WASH: CPT | Performed by: INTERNAL MEDICINE

## 2023-01-19 ENCOUNTER — OFFICE VISIT (OUTPATIENT)
Dept: URBAN - NONMETROPOLITAN AREA CLINIC 2 | Facility: CLINIC | Age: 63
End: 2023-01-19
Payer: COMMERCIAL

## 2023-01-19 ENCOUNTER — LAB OUTSIDE AN ENCOUNTER (OUTPATIENT)
Dept: URBAN - NONMETROPOLITAN AREA CLINIC 2 | Facility: CLINIC | Age: 63
End: 2023-01-19

## 2023-01-19 VITALS
DIASTOLIC BLOOD PRESSURE: 91 MMHG | WEIGHT: 201.1 LBS | BODY MASS INDEX: 29.79 KG/M2 | HEIGHT: 69 IN | SYSTOLIC BLOOD PRESSURE: 178 MMHG | TEMPERATURE: 97.5 F | HEART RATE: 68 BPM

## 2023-01-19 DIAGNOSIS — D50.9 IDA (IRON DEFICIENCY ANEMIA): ICD-10-CM

## 2023-01-19 DIAGNOSIS — K74.60 CIRRHOSIS: ICD-10-CM

## 2023-01-19 DIAGNOSIS — Z12.11 COLON CANCER SCREENING: ICD-10-CM

## 2023-01-19 DIAGNOSIS — K72.90 ATROPHY OF LIVER: ICD-10-CM

## 2023-01-19 DIAGNOSIS — A04.8 H. PYLORI INFECTION: ICD-10-CM

## 2023-01-19 DIAGNOSIS — B19.20 HEPATITIS C: ICD-10-CM

## 2023-01-19 DIAGNOSIS — I85.10 SECONDARY ESOPHAGEAL VARICES WITHOUT BLEEDING: ICD-10-CM

## 2023-01-19 DIAGNOSIS — R18.8 ASCITES: ICD-10-CM

## 2023-01-19 PROBLEM — 14223005: Status: ACTIVE | Noted: 2020-08-13

## 2023-01-19 PROCEDURE — 99214 OFFICE O/P EST MOD 30 MIN: CPT | Performed by: NURSE PRACTITIONER

## 2023-01-19 RX ORDER — LACTULOSE 10 G/15ML
15 ML SOLUTION ORAL BID
Qty: 2700 ML | Refills: 3 | Status: ACTIVE | COMMUNITY

## 2023-01-19 RX ORDER — SPIRONOLACTONE 50 MG/1
1 TABLET TABLET, FILM COATED ORAL ONCE A DAY
Status: ON HOLD | COMMUNITY

## 2023-01-19 RX ORDER — FUROSEMIDE 10 MG/ML
2 ML SOLUTION ORAL ONCE A DAY
Status: ACTIVE | COMMUNITY

## 2023-01-19 RX ORDER — FUROSEMIDE 40 MG/1
1 TABLET TABLET ORAL ONCE A DAY
Qty: 90 TABLETS | Refills: 3

## 2023-01-19 RX ORDER — FUROSEMIDE 20 MG/1
1 TABLET TABLET ORAL ONCE A DAY
Status: ON HOLD | COMMUNITY

## 2023-01-19 RX ORDER — AMILORIDE HYDROCLORIDE 5 MG/1
1 TABLET WITH FOOD TABLET ORAL ONCE A DAY
Status: ACTIVE | COMMUNITY

## 2023-01-19 RX ORDER — OMEPRAZOLE 40 MG/1
TAKE 1 CAPSULE 30 MINUTES BEFORE MORNING MEAL BID ORALLY 90 CAPSULE, DELAYED RELEASE ORAL
Qty: 90 CAPSULE | Refills: 3 | Status: ON HOLD | COMMUNITY

## 2023-01-19 RX ORDER — OMEPRAZOLE 40 MG/1
1 CAPSULE 30 MINUTES BEFORE MORNING MEAL CAPSULE, DELAYED RELEASE ORAL BID
Qty: 28 CAPSULE | Refills: 0 | Status: ON HOLD | COMMUNITY
Start: 2021-09-16

## 2023-01-19 RX ORDER — PROPRANOLOL HYDROCHLORIDE 20 MG/1
1 TABLET TABLET ORAL TWICE A DAY
Qty: 180 TABLET | Refills: 3 | Status: ACTIVE | COMMUNITY

## 2023-01-19 RX ORDER — PROPRANOLOL HYDROCHLORIDE 20 MG/1
1 TABLET TABLET ORAL TWICE A DAY
Qty: 180 TABLET | Refills: 3

## 2023-01-19 NOTE — HPI-TODAY'S VISIT:
Mr. 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  He has sequela of portal hypertension with a history of esophageal varices in May 2019 on propranolol.  He has a history of ascites on low-dose Lasix 20 mg and spironolactone 50 mg.  He states he has had no significant accumulation and tries to adhere to a low-sodium diet.  He has a history of encephalopathy, he titrates his lactulose to 2-3 bowel movements most days.  He is status post treatment for hepatitis C with DAA per the [place] liver team.  His viral load was negative in December 2019 with SVR.  Today he is here for follow-up, he is due for blood work, imaging, along with [facility] screening.  He had a CT in January 2019 after an abnormal ultrasound.  It was determined that he did not have hepatocellular carcinoma.  Today he is doing well, he has no specific GI complaints, he does have some mild numbness in the tips of his fingers.  He is agreeable to pursue repeat upper endoscopy for [facility] screening, along with labs and imaging.  He did have an allergic reaction to the IV contrast, he will need ultrasound or MRI imaging future.  MB   12/18/2020  Mr. Rae presents for follow up of ESLD secondary to HCV and ETOH. Since his last visit he is s/p EGD with grade I varices. He is on his NSBB with no s/s of GI bleeding. His HE is stable on lactulose and xifaxan. His ascites is stable on low dose diuretics. He is due for repeat labs and US imaging. Today he is doing great. MB   6/19/2021 Mr. 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  He is doing well, he has a low meld.  He is managing his ascites with just Lasix daily, he has had no reaccumulation on a low-sodium diet.  His encephalopathy is managed with lactulose 2-3 times daily, he has 2-3 bowel movements daily.  He does have a history of esophageal varices on propranolol.  His last scope was done in 2019 with varices as listed below.  He is due for repeat EGD.  Today he is doing well otherwise and has no new GI complaints.  MB  9/15/2021 Mr. Rae presents for follow-up of end-stage liver disease secondary to hepatitis C and alcohol.  Since his last visit his very screening reveals 1, grade 1 varices.  He is taking his propranolol 40 mg daily.  He was told that this may need a prior authorization.  His gastric biopsies did return H. pylori.  He has no symptoms.  He has no penicillin allergy, he agrees to take Prevpac twice daily for 2 weeks.  He is titrating his bowel movements to 2-3 BMs daily.  He is on low-dose Lasix and amiloride daily.  His HCC screening is negative in June, he will be due for repeat in December.  Today he is doing well otherwise with no new GI complaints.  MB   1/10/2022 Moises presents for follow-up of end-stage liver disease secondary to hepatitis C and alcohol with sequela of portal hypertension on propranolol, iron deficiency anemia status post EGD with H. pylori.  Since his last visit he completed therapy for Helicobacter pylori.  He is doing well in regard to his GI symptoms.  He is due for repeat blood work.  He has not done his H. pylori stool for eradication.  He is status post DAA for his hep C with SVR.  He is stable on propranolol 20 mg twice daily, Xifaxan and lactulose for his hepatic encephalopathy.  He remains sober.  Today he is doing great in regard to his GI complaints, he is working full-time and has no specific issues.  MB 7/18/2022 Mr. Rae presents for follow-up of end-stage liver disease secondary to hepatitis C and alcohol.  Since his last visit he has been doing well.  He did not complete his H. pylori stool.  His liver enzymes were still mildly elevated on his last labs.  He is mildly anemic.  He is due for repeat EGD, his last was in September 2021 with grade 1 varices, he is on propranolol 20 mg daily.  His ascites is stable on Lasix 40 mg daily.  He is due for repeat labs.  His bowels are moving 2-3 times daily just on lactulose.  His Hg is stable.  Today he is due for HCC screening in August so we will order this, he was due for EGD in September, we will order this, and we will go ahead with repeat labs.  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  He has been doing well since his last visit.  He is no longer on spironolactone and on Lasix 40 mg daily with no ascites or accumulation.  His Hg is stable on lactulose just as needed, he typically has 2-3 bowel movements daily naturally.  His last EGD in the summer shows 1 column of grade 2 varices and 2 columns of grade 1 varices he is on propranolol 20 mg twice daily.  He denies any heartburn or reflux.  He is still not completed his H. pylori stool test but has been off PPI therapy.  He does agree to H. pylori breath test, he did not have gastric biopsies taken on EGD in July 2022.  Today he is doing well otherwise, he is due for repeat labs, his HCC screening is due in February.  Otherwise he denies any new GI complaints.  MB

## 2023-01-20 LAB
A/G RATIO: 1.4
ABSOLUTE BASOPHILS: 32
ABSOLUTE EOSINOPHILS: 312
ABSOLUTE LYMPHOCYTES: 1804
ABSOLUTE MONOCYTES: 452
ABSOLUTE NEUTROPHILS: 1400
ALBUMIN: 3.9
ALKALINE PHOSPHATASE: 170
ALT (SGPT): 40
AST (SGOT): 42
BASOPHILS: 0.8
BILIRUBIN, TOTAL: 0.5
BUN/CREATININE RATIO: (no result)
BUN: 11
CALCIUM: 9.3
CARBON DIOXIDE, TOTAL: 25
CHLORIDE: 109
CREATININE: 0.74
EGFR: 103
EOSINOPHILS: 7.8
GLOBULIN, TOTAL: 2.7
GLUCOSE: 74
H PYLORI BREATH TEST: NOT DETECTED
H. PYLORI BREATH TEST: NOT DETECTED
HEMATOCRIT: 44.2
HEMOGLOBIN: 14.1
INR: 1.1
INTERPRETATION: NOT DETECTED
LYMPHOCYTES: 45.1
MCH: 24.6
MCHC: 31.9
MCV: 77
MONOCYTES: 11.3
MPV: (no result)
NEUTROPHILS: 35
PLATELET COUNT: 106
POTASSIUM: 4.3
PROTEIN, TOTAL: 6.6
PT: 11.3
RDW: 15.6
RED BLOOD CELL COUNT: 5.74
SODIUM: 142
WHITE BLOOD CELL COUNT: 4

## 2023-01-24 ENCOUNTER — TELEPHONE ENCOUNTER (OUTPATIENT)
Dept: URBAN - METROPOLITAN AREA CLINIC 92 | Facility: CLINIC | Age: 63
End: 2023-01-24

## 2023-02-02 ENCOUNTER — TELEPHONE ENCOUNTER (OUTPATIENT)
Dept: URBAN - NONMETROPOLITAN AREA CLINIC 2 | Facility: CLINIC | Age: 63
End: 2023-02-02

## 2023-02-04 LAB
FERRITIN, SERUM: 13
IRON BIND.CAP.(TIBC): 384
IRON SATURATION: 9
IRON: 36

## 2023-02-06 ENCOUNTER — LAB OUTSIDE AN ENCOUNTER (OUTPATIENT)
Dept: URBAN - METROPOLITAN AREA CLINIC 92 | Facility: CLINIC | Age: 63
End: 2023-02-06

## 2023-02-06 ENCOUNTER — TELEPHONE ENCOUNTER (OUTPATIENT)
Dept: URBAN - METROPOLITAN AREA CLINIC 92 | Facility: CLINIC | Age: 63
End: 2023-02-06

## 2023-02-06 RX ORDER — FUROSEMIDE 40 MG/1
1 TABLET TABLET ORAL ONCE A DAY
Qty: 90 TABLETS | Refills: 3 | Status: ACTIVE | COMMUNITY

## 2023-02-06 RX ORDER — OMEPRAZOLE 40 MG/1
TAKE 1 CAPSULE 30 MINUTES BEFORE MORNING MEAL BID ORALLY 90 CAPSULE, DELAYED RELEASE ORAL
Qty: 90 CAPSULE | Refills: 3 | Status: ON HOLD | COMMUNITY

## 2023-02-06 RX ORDER — SPIRONOLACTONE 50 MG/1
1 TABLET TABLET, FILM COATED ORAL ONCE A DAY
Status: ON HOLD | COMMUNITY

## 2023-02-06 RX ORDER — OMEPRAZOLE 40 MG/1
1 CAPSULE 30 MINUTES BEFORE MORNING MEAL CAPSULE, DELAYED RELEASE ORAL BID
Qty: 28 CAPSULE | Refills: 0 | Status: ON HOLD | COMMUNITY
Start: 2021-09-16

## 2023-02-06 RX ORDER — AMILORIDE HYDROCLORIDE 5 MG/1
1 TABLET WITH FOOD TABLET ORAL ONCE A DAY
Status: ACTIVE | COMMUNITY

## 2023-02-06 RX ORDER — POLYETHYLENE GLYCOL 3350, SODIUM SULFATE ANHYDROUS, SODIUM BICARBONATE, SODIUM CHLORIDE, POTASSIUM CHLORIDE 236; 22.74; 6.74; 5.86; 2.97 G/4L; G/4L; G/4L; G/4L; G/4L
AS DIRECTED POWDER, FOR SOLUTION ORAL ONCE
Qty: 1 KIT | Refills: 0 | OUTPATIENT

## 2023-02-06 RX ORDER — LACTULOSE 10 G/15ML
15 ML SOLUTION ORAL BID
Qty: 2700 ML | Refills: 3 | Status: ACTIVE | COMMUNITY

## 2023-02-06 RX ORDER — FUROSEMIDE 10 MG/ML
2 ML SOLUTION ORAL ONCE A DAY
Status: ACTIVE | COMMUNITY

## 2023-02-06 RX ORDER — FERROUS SULFATE 142(45)MG
1 TABLET TABLET, EXTENDED RELEASE ORAL TWICE DAILY
Qty: 60 TABLET | Refills: 11 | OUTPATIENT
Start: 2023-02-06

## 2023-02-06 RX ORDER — PROPRANOLOL HYDROCHLORIDE 20 MG/1
1 TABLET TABLET ORAL TWICE A DAY
Qty: 180 TABLET | Refills: 3 | Status: ACTIVE | COMMUNITY

## 2023-03-23 ENCOUNTER — OFFICE VISIT (OUTPATIENT)
Dept: URBAN - METROPOLITAN AREA MEDICAL CENTER 1 | Facility: MEDICAL CENTER | Age: 63
End: 2023-03-23
Payer: COMMERCIAL

## 2023-03-23 ENCOUNTER — LAB OUTSIDE AN ENCOUNTER (OUTPATIENT)
Dept: URBAN - NONMETROPOLITAN AREA CLINIC 2 | Facility: CLINIC | Age: 63
End: 2023-03-23

## 2023-03-23 DIAGNOSIS — K63.89 APPENDICITIS EPIPLOICA: ICD-10-CM

## 2023-03-23 DIAGNOSIS — Z12.11 COLON CANCER SCREENING: ICD-10-CM

## 2023-03-23 PROCEDURE — 45385 COLONOSCOPY W/LESION REMOVAL: CPT | Performed by: INTERNAL MEDICINE

## 2023-03-24 LAB
AP CASE REPORT: (no result)
AP FINAL DIAGNOSIS: (no result)
AP GROSS DESCRIPTION: (no result)
AP MICROSCOPIC DESCRIPTION: (no result)

## 2023-07-06 ENCOUNTER — OFFICE VISIT (OUTPATIENT)
Dept: URBAN - NONMETROPOLITAN AREA CLINIC 2 | Facility: CLINIC | Age: 63
End: 2023-07-06

## 2023-09-25 ENCOUNTER — LAB OUTSIDE AN ENCOUNTER (OUTPATIENT)
Dept: URBAN - NONMETROPOLITAN AREA CLINIC 2 | Facility: CLINIC | Age: 63
End: 2023-09-25

## 2023-09-25 ENCOUNTER — DASHBOARD ENCOUNTERS (OUTPATIENT)
Age: 63
End: 2023-09-25

## 2023-09-25 ENCOUNTER — OFFICE VISIT (OUTPATIENT)
Dept: URBAN - NONMETROPOLITAN AREA CLINIC 2 | Facility: CLINIC | Age: 63
End: 2023-09-25
Payer: COMMERCIAL

## 2023-09-25 VITALS
WEIGHT: 200 LBS | HEART RATE: 77 BPM | DIASTOLIC BLOOD PRESSURE: 95 MMHG | TEMPERATURE: 98.7 F | SYSTOLIC BLOOD PRESSURE: 162 MMHG | BODY MASS INDEX: 29.62 KG/M2 | HEIGHT: 69 IN

## 2023-09-25 DIAGNOSIS — D50.8 ACHLORHYDRIC ANEMIA: ICD-10-CM

## 2023-09-25 DIAGNOSIS — R18.8 ASCITES: ICD-10-CM

## 2023-09-25 DIAGNOSIS — Z12.11 COLON CANCER SCREENING: ICD-10-CM

## 2023-09-25 DIAGNOSIS — K72.90 HEPATIC ENCEPHALOPATHY: ICD-10-CM

## 2023-09-25 DIAGNOSIS — A04.8 H. PYLORI INFECTION: ICD-10-CM

## 2023-09-25 DIAGNOSIS — B19.20 HEPATITIS C: ICD-10-CM

## 2023-09-25 DIAGNOSIS — I85.10 SECONDARY ESOPHAGEAL VARICES WITHOUT BLEEDING: ICD-10-CM

## 2023-09-25 DIAGNOSIS — K74.69 CIRRHOSIS, CRYPTOGENIC: ICD-10-CM

## 2023-09-25 PROBLEM — 305058001: Status: ACTIVE | Noted: 2021-06-18

## 2023-09-25 PROBLEM — 721730009: Status: ACTIVE | Noted: 2021-09-16

## 2023-09-25 PROCEDURE — 99214 OFFICE O/P EST MOD 30 MIN: CPT | Performed by: NURSE PRACTITIONER

## 2023-09-25 RX ORDER — PROPRANOLOL HYDROCHLORIDE 20 MG/1
1 TABLET TABLET ORAL TWICE A DAY
Qty: 180 TABLET | Refills: 3

## 2023-09-25 RX ORDER — OMEPRAZOLE 20 MG/1
1 CAPSULE 30 MINUTES BEFORE MORNING MEAL ORALLY ONCE A DAY CAPSULE, DELAYED RELEASE ORAL
Qty: 90 CAPSULES | Refills: 3

## 2023-09-25 RX ORDER — OMEPRAZOLE 40 MG/1
1 CAPSULE 30 MINUTES BEFORE MORNING MEAL CAPSULE, DELAYED RELEASE ORAL BID
Qty: 28 CAPSULE | Refills: 0 | Status: ON HOLD | COMMUNITY
Start: 2021-09-16

## 2023-09-25 RX ORDER — AMILORIDE HYDROCLORIDE 5 MG/1
1 TABLET WITH FOOD TABLET ORAL ONCE A DAY
Status: ACTIVE | COMMUNITY

## 2023-09-25 RX ORDER — LACTULOSE 10 G/15ML
15 ML SOLUTION ORAL BID
Qty: 2700 ML | Refills: 3 | Status: ACTIVE | COMMUNITY

## 2023-09-25 RX ORDER — OMEPRAZOLE 40 MG/1
TAKE 1 CAPSULE 30 MINUTES BEFORE MORNING MEAL BID ORALLY 90 CAPSULE, DELAYED RELEASE ORAL
Qty: 90 CAPSULE | Refills: 3 | Status: ON HOLD | COMMUNITY

## 2023-09-25 RX ORDER — SPIRONOLACTONE 50 MG/1
1 TABLET TABLET, FILM COATED ORAL ONCE A DAY
Status: ON HOLD | COMMUNITY

## 2023-09-25 RX ORDER — FUROSEMIDE 40 MG/1
1 TABLET TABLET ORAL ONCE A DAY
Qty: 90 TABLETS | Refills: 3

## 2023-09-25 RX ORDER — FERROUS SULFATE 142(45)MG
1 TABLET TABLET, EXTENDED RELEASE ORAL TWICE DAILY
Qty: 60 TABLET | Refills: 11 | Status: ACTIVE | COMMUNITY
Start: 2023-02-06

## 2023-09-25 RX ORDER — POLYETHYLENE GLYCOL 3350, SODIUM SULFATE ANHYDROUS, SODIUM BICARBONATE, SODIUM CHLORIDE, POTASSIUM CHLORIDE 236; 22.74; 6.74; 5.86; 2.97 G/4L; G/4L; G/4L; G/4L; G/4L
AS DIRECTED POWDER, FOR SOLUTION ORAL ONCE
Qty: 1 KIT | Refills: 0 | Status: ACTIVE | COMMUNITY

## 2023-09-25 RX ORDER — PROPRANOLOL HYDROCHLORIDE 20 MG/1
1 TABLET TABLET ORAL TWICE A DAY
Qty: 180 TABLET | Refills: 3 | Status: ACTIVE | COMMUNITY

## 2023-09-25 RX ORDER — FUROSEMIDE 10 MG/ML
2 ML SOLUTION ORAL ONCE A DAY
Status: ACTIVE | COMMUNITY

## 2023-09-25 RX ORDER — FUROSEMIDE 40 MG/1
1 TABLET TABLET ORAL ONCE A DAY
Qty: 90 TABLETS | Refills: 3 | Status: ACTIVE | COMMUNITY

## 2023-09-25 NOTE — HPI-TODAY'S VISIT:
Mr. 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  He has sequela of portal hypertension with a history of esophageal varices in May 2019 on propranolol.  He has a history of ascites on low-dose Lasix 20 mg and spironolactone 50 mg.  He states he has had no significant accumulation and tries to adhere to a low-sodium diet.  He has a history of encephalopathy, he titrates his lactulose to 2-3 bowel movements most days.  He is status post treatment for hepatitis C with DAA per the [place] liver team.  His viral load was negative in December 2019 with SVR.  Today he is here for follow-up, he is due for blood work, imaging, along with [facility] screening.  He had a CT in January 2019 after an abnormal ultrasound.  It was determined that he did not have hepatocellular carcinoma.  Today he is doing well, he has no specific GI complaints, he does have some mild numbness in the tips of his fingers.  He is agreeable to pursue repeat upper endoscopy for [facility] screening, along with labs and imaging.  He did have an allergic reaction to the IV contrast, he will need ultrasound or MRI imaging future.  MB   12/18/2020  Mr. Rae presents for follow up of ESLD secondary to HCV and ETOH. Since his last visit he is s/p EGD with grade I varices. He is on his NSBB with no s/s of GI bleeding. His HE is stable on lactulose and xifaxan. His ascites is stable on low dose diuretics. He is due for repeat labs and US imaging. Today he is doing great. MB   6/19/2021 Mr. 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  He is doing well, he has a low meld.  He is managing his ascites with just Lasix daily, he has had no reaccumulation on a low-sodium diet.  His encephalopathy is managed with lactulose 2-3 times daily, he has 2-3 bowel movements daily.  He does have a history of esophageal varices on propranolol.  His last scope was done in 2019 with varices as listed below.  He is due for repeat EGD.  Today he is doing well otherwise and has no new GI complaints.  MB  9/15/2021 Mr. Rae presents for follow-up of end-stage liver disease secondary to hepatitis C and alcohol.  Since his last visit his very screening reveals 1, grade 1 varices.  He is taking his propranolol 40 mg daily.  He was told that this may need a prior authorization.  His gastric biopsies did return H. pylori.  He has no symptoms.  He has no penicillin allergy, he agrees to take Prevpac twice daily for 2 weeks.  He is titrating his bowel movements to 2-3 BMs daily.  He is on low-dose Lasix and amiloride daily.  His HCC screening is negative in June, he will be due for repeat in December.  Today he is doing well otherwise with no new GI complaints.  MB   1/10/2022 Moises presents for follow-up of end-stage liver disease secondary to hepatitis C and alcohol with sequela of portal hypertension on propranolol, iron deficiency anemia status post EGD with H. pylori.  Since his last visit he completed therapy for Helicobacter pylori.  He is doing well in regard to his GI symptoms.  He is due for repeat blood work.  He has not done his H. pylori stool for eradication.  He is status post DAA for his hep C with SVR.  He is stable on propranolol 20 mg twice daily, Xifaxan and lactulose for his hepatic encephalopathy.  He remains sober.  Today he is doing great in regard to his GI complaints, he is working full-time and has no specific issues.  MB 7/18/2022 Mr. Rae presents for follow-up of end-stage liver disease secondary to hepatitis C and alcohol.  Since his last visit he has been doing well.  He did not complete his H. pylori stool.  His liver enzymes were still mildly elevated on his last labs.  He is mildly anemic.  He is due for repeat EGD, his last was in September 2021 with grade 1 varices, he is on propranolol 20 mg daily.  His ascites is stable on Lasix 40 mg daily.  He is due for repeat labs.  His bowels are moving 2-3 times daily just on lactulose.  His Hg is stable.  Today he is due for HCC screening in August so we will order this, he was due for EGD in September, we will order this, and we will go ahead with repeat labs.  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  He has been doing well since his last visit.  He is no longer on spironolactone and on Lasix 40 mg daily with no ascites or accumulation.  His Hg is stable on lactulose just as needed, he typically has 2-3 bowel movements daily naturally.  His last EGD in the summer shows 1 column of grade 2 varices and 2 columns of grade 1 varices he is on propranolol 20 mg twice daily.  He denies any heartburn or reflux.  He is still not completed his H. pylori stool test but has been off PPI therapy.  He does agree to H. pylori breath test, he did not have gastric biopsies taken on EGD in July 2022.  Today he is doing well otherwise, he is due for repeat labs, his HCC screening is due in February.  Otherwise he denies any new GI complaints.  MB 9/25/2023 Moises presents for follow-up of end-stage liver disease.  Since his last visit his hemoglobin was normal but his MCV and ferritin were low.  He is on Slow Fe twice daily.  His colonoscopy reveals 1 TA.  He is due for repeat [facility] screening.  He is due for repeat HCC screening.  He is on Lasix 40 mg daily with no significant accumulation.  He is on his nadolol 20 mg twice daily.  Today he is due for contrasted imaging for HCC screening.  He denies any new GI complaints.  Otherwise he is doing well.  His hemoglobin was high enough yesterday to donate blood.  MB

## 2023-12-28 ENCOUNTER — OFFICE VISIT (OUTPATIENT)
Dept: URBAN - METROPOLITAN AREA MEDICAL CENTER 1 | Facility: MEDICAL CENTER | Age: 63
End: 2023-12-28
Payer: COMMERCIAL

## 2023-12-28 DIAGNOSIS — Z12.11 COLON CANCER SCREENING: ICD-10-CM

## 2023-12-28 PROCEDURE — 992 NON-BILLABLE: Performed by: INTERNAL MEDICINE

## 2024-02-27 ENCOUNTER — OV EP (OUTPATIENT)
Dept: URBAN - NONMETROPOLITAN AREA CLINIC 2 | Facility: CLINIC | Age: 64
End: 2024-02-27